# Patient Record
Sex: MALE | Race: OTHER | NOT HISPANIC OR LATINO | URBAN - METROPOLITAN AREA
[De-identification: names, ages, dates, MRNs, and addresses within clinical notes are randomized per-mention and may not be internally consistent; named-entity substitution may affect disease eponyms.]

---

## 2018-08-21 ENCOUNTER — EMERGENCY (EMERGENCY)
Facility: HOSPITAL | Age: 48
LOS: 1 days | Discharge: HOME | End: 2018-08-21
Attending: EMERGENCY MEDICINE
Payer: COMMERCIAL

## 2018-08-21 VITALS
WEIGHT: 229.94 LBS | RESPIRATION RATE: 17 BRPM | HEIGHT: 65 IN | HEART RATE: 88 BPM | SYSTOLIC BLOOD PRESSURE: 160 MMHG | DIASTOLIC BLOOD PRESSURE: 97 MMHG | OXYGEN SATURATION: 98 % | TEMPERATURE: 98 F

## 2018-08-21 PROCEDURE — 99284 EMERGENCY DEPT VISIT MOD MDM: CPT | Mod: 25

## 2018-08-21 PROCEDURE — 70450 CT HEAD/BRAIN W/O DYE: CPT

## 2018-08-21 PROCEDURE — 12002 RPR S/N/AX/GEN/TRNK2.6-7.5CM: CPT

## 2018-08-21 PROCEDURE — 70450 CT HEAD/BRAIN W/O DYE: CPT | Mod: 26

## 2018-08-21 RX ORDER — ACETAMINOPHEN 500 MG
975 TABLET ORAL ONCE
Qty: 0 | Refills: 0 | Status: COMPLETED | OUTPATIENT
Start: 2018-08-21 | End: 2018-08-21

## 2018-08-21 RX ADMIN — Medication 975 MILLIGRAM(S): at 22:04

## 2018-08-21 NOTE — ED PROVIDER NOTE - OBJECTIVE STATEMENT
46 y/o M pt presents to the ED c/o head pain s/p falling today.  Pt states he slipped on water and fell onto the floor, obtaining a cut to the back of his head.  Denies LOC, CP, neck pain, back pain.  No further acute complaints at this time.

## 2018-08-21 NOTE — ED ADULT NURSE NOTE - NSIMPLEMENTINTERV_GEN_ALL_ED
Implemented All Fall Risk Interventions:  [place] to call system. Call bell, personal items and telephone within reach. Instruct patient to call for assistance. Room bathroom lighting operational. Non-slip footwear when patient is off stretcher. Physically safe environment: no spills, clutter or unnecessary equipment. Stretcher in lowest position, wheels locked, appropriate side rails in place. Provide visual cue, wrist band, yellow gown, etc. Monitor gait and stability. Monitor for mental status changes and reorient to person, place, and time. Review medications for side effects contributing to fall risk. Reinforce activity limits and safety measures with patient and family.

## 2018-08-21 NOTE — ED PROVIDER NOTE - MEDICAL DECISION MAKING DETAILS
46 y/o M with occipital laceration s/p fall. Concern for internal cranial hemorrhage.  Will obtain CT head, labs, and re-evaluate.

## 2018-08-21 NOTE — ED ADULT TRIAGE NOTE - CHIEF COMPLAINT QUOTE
pt was visiting family on floor and slipped on water and hit his head, denies loc, bleeding controlled pt was visiting family on tower, spilled his cup of water and then slipped on the floor and hit his head, denies loc, bleeding controlled

## 2018-08-21 NOTE — ED ADULT NURSE NOTE - OBJECTIVE STATEMENT
Pt received sitting on stretcher in NAD. Pt AOx3 C/o  slipped on a wet floor and fell on his head. pt noterd to have 4 cm laceration to the back of his head.  Neuro WNL. PERRLA. Lungs CTA, RR even unlabored. Ab soft non tender, + bowel sounds x 4quads. Denies Nausea, Vomiting, Diarrhea. Skin warm, dry, color appropriate for age and race.

## 2018-08-21 NOTE — ED ADULT NURSE NOTE - CHIEF COMPLAINT QUOTE
pt was visiting family on tower, spilled his cup of water and then slipped on the floor and hit his head, denies loc, bleeding controlled

## 2021-12-09 ENCOUNTER — EMERGENCY (EMERGENCY)
Facility: HOSPITAL | Age: 51
LOS: 1 days | Discharge: HOME | End: 2021-12-09
Attending: EMERGENCY MEDICINE | Admitting: EMERGENCY MEDICINE
Payer: OTHER MISCELLANEOUS

## 2021-12-09 VITALS
SYSTOLIC BLOOD PRESSURE: 158 MMHG | HEART RATE: 108 BPM | RESPIRATION RATE: 18 BRPM | TEMPERATURE: 99 F | HEIGHT: 65 IN | DIASTOLIC BLOOD PRESSURE: 97 MMHG | OXYGEN SATURATION: 97 % | WEIGHT: 250 LBS

## 2021-12-09 VITALS
HEART RATE: 96 BPM | RESPIRATION RATE: 18 BRPM | OXYGEN SATURATION: 96 % | DIASTOLIC BLOOD PRESSURE: 90 MMHG | TEMPERATURE: 98 F | SYSTOLIC BLOOD PRESSURE: 152 MMHG
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
## 2021-12-09 DIAGNOSIS — S82.831A OTHER FRACTURE OF UPPER AND LOWER END OF RIGHT FIBULA, INITIAL ENCOUNTER FOR CLOSED FRACTURE: ICD-10-CM

## 2021-12-09 DIAGNOSIS — M25.571 PAIN IN RIGHT ANKLE AND JOINTS OF RIGHT FOOT: ICD-10-CM
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
## 2021-12-09 DIAGNOSIS — Z88.0 ALLERGY STATUS TO PENICILLIN: ICD-10-CM
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
## 2021-12-09 PROCEDURE — 99284 EMERGENCY DEPT VISIT MOD MDM: CPT | Mod: 25

## 2021-12-09 PROCEDURE — 73700 CT LOWER EXTREMITY W/O DYE: CPT | Mod: 26,RT

## 2021-12-09 PROCEDURE — 73600 X-RAY EXAM OF ANKLE: CPT | Mod: 26,RT

## 2021-12-09 PROCEDURE — 73630 X-RAY EXAM OF FOOT: CPT | Mod: 26,RT

## 2021-12-09 RX ORDER — IBUPROFEN 200 MG
1 TABLET ORAL
Qty: 120 | Refills: 0
Start: 2021-12-09 | End: 2022-01-07

## 2021-12-09 RX ORDER — OXYCODONE AND ACETAMINOPHEN 5; 325 MG/1; MG/1
1 TABLET ORAL
Qty: 12 | Refills: 0
Start: 2021-12-09 | End: 2021-12-11

## 2021-12-09 RX ORDER — OXYCODONE AND ACETAMINOPHEN 5; 325 MG/1; MG/1
2 TABLET ORAL ONCE
Refills: 0 | Status: DISCONTINUED | OUTPATIENT
Start: 2021-12-09 | End: 2021-12-09

## 2021-12-09 RX ORDER — OXYCODONE AND ACETAMINOPHEN 5; 325 MG/1; MG/1
1 TABLET ORAL ONCE
Refills: 0 | Status: DISCONTINUED | OUTPATIENT
Start: 2021-12-09 | End: 2021-12-09

## 2021-12-09 RX ADMIN — OXYCODONE AND ACETAMINOPHEN 1 TABLET(S): 5; 325 TABLET ORAL at 19:39

## 2021-12-09 RX ADMIN — OXYCODONE AND ACETAMINOPHEN 2 TABLET(S): 5; 325 TABLET ORAL at 21:47

## 2021-12-09 NOTE — ED PROVIDER NOTE - OBJECTIVE STATEMENT
50 yo male presents with the complaint of right ankle pain. while walking around this evening pt inverted right ankle - pain with event.     no fall no associated trauma

## 2021-12-09 NOTE — ED PROVIDER NOTE - NSFOLLOWUPINSTRUCTIONS_ED_ALL_ED_FT
take pain medications as directed     follow up with orthopedics this week     use crutches     return to ER for worsening pain to ankle or for any other concern

## 2021-12-09 NOTE — ED PROVIDER NOTE - PATIENT PORTAL LINK FT
You can access the FollowMyHealth Patient Portal offered by [facility] by registering at the following website: [email or web address]. By joining [employer]’s FollowMyHealth portal, you will also be able to view your health information using other applications (apps) compatible with our system.

## 2021-12-09 NOTE — ED PROVIDER NOTE - CARE PROVIDER_API CALL
Diego Terrazas)  Orthopaedic Surgery  [address] [place] [address], [address]  [place] [address]  Phone: [phone]  Fax: [phone]  Follow Up Time: 1-3 Days

## 2021-12-09 NOTE — ED PROVIDER NOTE - MUSCULOSKELETAL, MLM
+ tenderness and swelling to right lateral ankle skin intact NVI leg compartments soft pain in ankle with weight bearing

## 2021-12-09 NOTE — ED ADULT NURSE NOTE - CAS ELECT INFOMATION PROVIDED
Pt fitted with walking boot and sized crutches, aware of f/u instructions with ortho, dc meds and pain mgmt reviewed/DC instructions

## 2024-10-04 NOTE — ED PROVIDER NOTE - SECONDARY DIAGNOSIS.
Refill sent to Dr. Rome to send to [facility].    Vianney, LISA   Scalp laceration

## 2024-10-07 NOTE — ED ADULT TRIAGE NOTE - NSTRIAGECARE_GEN_A_ER
Face Mask [FreeTextEntry1] : labwork from 10/28/2018: hga1c 4.7, gluc 88, chol 215, tg 182, hdl 43, ldl 160\par  reported weight 148 (11/4/2019), bmi 23.9\par  \par  EKG from 2/24/2021 reviewed: LAFB noted; Qtc 423ms; patient advised to f/u with PMD in context of his MS and EKG results\par  Lab work from 1/2021 reviewed; Cholesterol and HDL and A1C noted and advised patient to f/u with PMD for same and encouraged diet modification.
